# Patient Record
Sex: FEMALE | Race: WHITE | NOT HISPANIC OR LATINO | URBAN - METROPOLITAN AREA
[De-identification: names, ages, dates, MRNs, and addresses within clinical notes are randomized per-mention and may not be internally consistent; named-entity substitution may affect disease eponyms.]

---
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
## 2019-01-22 ENCOUNTER — EMERGENCY (EMERGENCY)
Facility: HOSPITAL | Age: 19
LOS: 1 days | Discharge: HOME | End: 2019-01-22
Attending: EMERGENCY MEDICINE | Admitting: EMERGENCY MEDICINE
Payer: MEDICAID

## 2019-01-22 VITALS
HEIGHT: 65 IN | DIASTOLIC BLOOD PRESSURE: 75 MMHG | TEMPERATURE: 98 F | RESPIRATION RATE: 18 BRPM | WEIGHT: 160.06 LBS | OXYGEN SATURATION: 97 % | SYSTOLIC BLOOD PRESSURE: 133 MMHG | HEART RATE: 92 BPM

## 2019-01-22 VITALS
SYSTOLIC BLOOD PRESSURE: 128 MMHG | HEART RATE: 88 BPM | OXYGEN SATURATION: 98 % | RESPIRATION RATE: 16 BRPM | DIASTOLIC BLOOD PRESSURE: 70 MMHG

## 2019-01-22 PROCEDURE — 99281 EMR DPT VST MAYX REQ PHY/QHP: CPT

## 2019-01-22 NOTE — ED ADULT TRIAGE NOTE - CHIEF COMPLAINT QUOTE
" Laceration to left 2nd finger from a knife 3PM yesterday, was seen at [facility], had Dermabond applied, started bleeding again "

## 2019-01-22 NOTE — ED PROVIDER NOTE - PROGRESS NOTE DETAILS
An opportunity to ask questions was given.  Discussed the importance of prompt, close medical follow-up.  Patient will return with any changes, concerns or persistent / worsening symptoms.  Understanding of all instructions verbalized.  wound care and signs of infection discussed. lac was reinforced with steri-strips and bulky dressing applied

## 2019-01-22 NOTE — ED PROVIDER NOTE - SKIN, MLM
healing 2 cm irregular flap lac to left index finger (distal phalanx, volar aspect) with dermabond in place. no current oozing of blood. no surrounding erythema, warmth or tenderness

## 2019-01-22 NOTE — ED PROVIDER NOTE - NSFOLLOWUPINSTRUCTIONS_ED_ALL_ED_FT
keep wound clean and dry. steri strips will fall off on their own. follow up with primary care provider or return to ED for any pain, redness, or signs of infection

## 2019-01-22 NOTE — ED PROVIDER NOTE - ATTENDING CONTRIBUTION TO CARE
I, Dr Simons, have personally performed a face to face diagnostic evaluation on this patient with the PA/NP. I have reviewed the PA/NP's note and agree with the history, Physical exam and plan of care As per history presents to ED for wound check of left index. cut her finger with a knife yesterday late afternoon. went to [facility] ED and lac was repaired with dermabond. patient noticed some oozing of blood after she was discharged but told "it was fine". noticed some more oozing of blood this am so wanted her finger checked. no pain or surrounding redness. no n/t. no bony tenderness. right handed. tetanus utd PE wound dehisce no s/s of infection wound redress to fallow up with PMD.

## 2019-01-22 NOTE — ED ADULT NURSE NOTE - CHIEF COMPLAINT QUOTE
" Laceration to left 2nd finger from a knife 3PM yesterday, was seen at [facility], had Dermabond applied, started bleeding again "

## 2019-01-22 NOTE — ED PROVIDER NOTE - OBJECTIVE STATEMENT
presents to ED for wound check of left index. cut her finger with a knife yesterday late afternoon. went to [facility] ED and lac was repaired with dermabond. patient noticed some oozing of blood after she was discharged but told "it was fine". noticed some more oozing of blood this am so wanted her finger checked. no pain or surrounding redness. no n/t. no bony tenderness. right handed. tetanus utd

## 2019-03-11 ENCOUNTER — TRANSCRIPTION ENCOUNTER (OUTPATIENT)
Age: 19
End: 2019-03-11

## 2020-10-20 NOTE — ED ADULT TRIAGE NOTE - HEIGHT IN CM
recent TTE in 08/2020 showing normal LVEF, stage 1 diastolic dysfunction  - TTE (10/6/20) LVEF of 55%   - Patient w/ continued LE edema, mild improvement  - Continue home dose Bumex (1mg PO BID)  - caution with excessive IVF 165.1

## 2022-01-13 NOTE — ED STATDOCS - NS ED MD EM SELECTION
[facility]  Certificate of Child Health Examination  Student's Name:   Fredy Restrepo Birth Date  2016  Sex  male  Race/Ethnicity   School/Grade Level/ID#     Address:   [address] [place] [address] [place] [address]  Parent/Guardian             Telephone#                                            Home:                               Work:   IMMUNIZATIONS: To be completed by health care provider. The mo/da/yr for every dose administered is required. If a specific vaccine is medically contraindicated, a separate written statement must be attached by the health care responsible for completing the health examination explaining the medical reason for the contraindication.      Immunization History   Administered Date(s) Administered   • COVID-19 5-11Y Pfizer-BioNtech 01/06/2022   • DTaP(INFANRIX) 03/02/2017   • DTaP/HIB/IPV 03/16/2016   • DTaP/Hep B/IPV 2015, 04/29/2016   • DTaP/IPV 12/07/2019   • Hep A, ped/adol, 2 dose 01/03/2017, 07/05/2017   • Hep B, adolescent or pediatric 2015   • Hib (PRP-T) 2015, 04/29/2016, 03/02/2017   • Influenza, injectable, quadrivalent, preservative-free 12/07/2019, 01/13/2022   • MMR 01/03/2017   • Measles Mumps Rubella Varicella 12/07/2019   • Pneumococcal Conjugate 13 valent 2015, 03/16/2016, 04/29/2016, 01/03/2017   • Rotavirus - monovalent 2015, 03/16/2016   • Varicella 01/03/2017   Up to date    Immunizations given 1/13/2022: None      Health care provider (MD, DO, APN, PA, school health professional, health official) verifying above immunization history must sign below. If adding dates to the above immunization history section, put your initials by date(s) and sign here.)  Signature                                                                 Title                                                Date  _____________________________________________________________________________________  Signature                                                                  Title                                                Date  _____________________________________________________________________________________   ALTERNATIVE PROOF OF IMMUNITY   1. Clinical diagnosis (measles, mumps, hepatitis B) is allowed when verified by physician and supported with lab confirmation.  Attach copy of lab result.    * MEASLES (Rubeola)  MO   DA  YR          **MUMPS  MO   DA  YR             HEPATITIS B  MO   DA   YR           VARICELLA  MO   DA  YR      2. History of varicella (chickenpox) disease is acceptable if verified by health care provider, school health professional or health official.  Person signing below verifies that the parent/guardian’s description of varicella disease history is indicative of past infection and is accepting such history as documentation of disease.  Date of Disease                                  Signature                                                           Title   3. Laboratory Evidence of Immunity (check one)      __ Measles*         __ Mumps**        __ Rubella        __Varicella    (Attach copy of lab result)         *All measles cases diagnosed on or after July 1, 2002, must be confirmed by laboratory evidence.      **All mumps cases diagnosed on or after July 1, 2013, must be confirmed by laboratory evidence.     Completion of Alternative 1 or 3 MUST be accompanied by Labs & Physician Signature: ___________________________  Physician Statements of Immunity MUST be submitted to [facility] for review.     Certificates of Hoahaoism Exemption to Immunizations or Physician Medical Statements of Medical Contraindication Are Reviewed   and Maintained by the School Authority     (11/2015)                                         (COMPLETE BOTH SIDES)                                  Approved [facility] 3/2016      Student's Name:  Fredy Restrepo Birth Date 2016 Sex male School Grade Level/ID#     Page 2 of 2   HEALTH HISTORY      TO BE COMPLETED AND SIGNED  BY PARENT/GUARDIAN AND VERIFIED BY HEALTH CARE PROVIDER  ALLERGIES: (Food, drug, insect, other) No has No Known Allergies.   MEDICATION: (List all prescribed or taken on a regular basis.)   None   Diagnosis of asthma?  Child wakes during night coughing? Yes    No  Yes    No  Loss of function of one of paired  organs?(eye/ear/kidney/testicle) Yes    No    Birth defects? Yes    No  Hospitalizations? Yes    No    Developmental delay? Yes    No   When? What for? Yes    No    Blood disorders? Hemophilia,  Sickle Cell, Other? Explain. Yes    No  Surgery? (List all.)  When? What for? Yes    No    Diabetes? Yes    No  Serious injury or illness? Yes    No    Head injury/Concussion/Passed out? Yes    No  TB skin test positive (past/present)? * Yes    No *If yes, refer to local [facility] dept   Seizures? What are they like?  Yes    No  TB disease (past or present)? * Yes    No *If yes, refer to local [facility] dept   Heart problem/Shortness of breath?  Yes    No  Tobacco use (type, frequency)?  Yes    No    Heart murmur/High blood pressure? Yes    No  Alcohol/Drug use?  Yes    No    Dizziness or chest pain with exercise? Yes    No  Family history of sudden death  before age 50? (Cause?) Yes    No    Eye/Vision problems? ______           __Glasses          __Contacts  Last exam by eye doctor ______  Other concerns? (crossed eye, drooping lids, squinting, difficulty reading) Dental?   __ Braces     __ Bridge     __ Plate   __Other   Ear/Hearing problems? Yes    No  Information may be shared with appropriate personnel for health and educational purposes.   Bone/Joint problem/injury/scoliosis? Yes    No  Parent/Guardian  Signature                                               Date   PHYSICAL EXAMINATION REQUIREMENTS   Entire section below to be completed by MD/DO/APN/PA Head Circumference if <2-3 years old - /65   Pulse 85   Temp 97.7 °F (36.5 °C)   Ht 4' (1.219 m)   Wt 26.9 kg (59 lb 6.4 oz)   BMI 18.13 kg/m²   BSA 0.95 m²     93 %ile (Z= 1.50) based on CDC (Boys, 2-20 Years) BMI-for-age based on BMI available as of 1/13/2022.   DIABETES SCREENING (NOT REQUIRED FOR ) BMI>85% age/sex Yes     And any two of the following: Family History: Yes  Ethnic Minority: Yes    Signs of Insulin Resistance (hypertension, dyslipidemia, polycystic ovarian syndrome, acanthosis nigricans): No  At Risk: No   LEAD RISK QUESTIONNAIRE Required for children age 6 months through 6 years enrolled in licensed or public school operated day care, , nursery school and/or . (Blood test required if resides in [place] or high risk [facility].)  Questionnaire Administered? No  Blood Test Indicated? Yes   Blood Test Date 1/13/22_____   Blood Test Result 5.2______________   TB SKIN OR BLOOD TEST Recommended only for children in high-risk groups including children immunosuppressed due to HIV infection or other conditions, frequent travel to or born in high prevalence countries or those exposed to adults in high-risk categories. See CDC guidelines. http://www.cdc.gov/tb/publications/factsheets/testing/TB_testing.htm.  Test Needed: No     Test performed: No                          Skin Test:    Date Read              /      /             Result:   Positive__      Negative__        mm________                          Blood Test: Date Reported       /      /               Result:  Positive__      Negative__      Value________  LAB TESTS (recommended) Date/Result  Date/Results   Hemoglobin or Hematocrit 1/13/22/ 11.6 Sickle Cell (when indicated) NA   Urinalysis NA Developmental Screening Tool NA        SYSTEM REVIEW Normal  Comments/Follow-up/Needs  Normal Comments/Follow-up/Needs   Skin  Yes  Endocrine Yes    Ears Yes                  Gastrointestinal Yes    Eyes Yes     Genito-Urinary deferred                                            LMP n/a   Nose Yes  Neurologic Yes    Throat Yes  Musculoskeletal Yes    Mouth/Dental No Dental caries Spinal Exam  Yes    Cardiovascular/HTN Yes  Nutritional status Yes    Respiratory Yes Diagnosis of Asthma: No   Mental Health Yes    Currently Prescribed Asthma Medication:        No  Quick-relief medication (e.g. Short Acting Beta Antagonist)        No   Controller medication (e.g. inhaled corticosteroid) Other  Follow up with dentist  Re-check lead level 1-3 months    NEEDS/MODIFICATIONS required in the school setting: No restrictions DIETARY Needs/Restrictions: No restrictions   SPECIAL INSTRUCTIONS/DEVICES e.g. safety glasses, glass eye, chest protector for arrhythmia, pacemaker, prosthetic device, dental bridge, false teeth, athletic support/cup: No restrictions   MENTAL HEALTH/OTHER Is there anything else the school should know about this student?  If you would like to discuss this student’s health with school or school health personnel:  Not needed   EMERGENCY ACTION needed while at school due to child’s health condition (e.g. ,seizures, asthma, insect sting, food, peanut allergy, bleeding problem, diabetes, heart problem)?   No   On the basis of the examination on this day, I approve this child’s participation in                                (If No or Modified please attach explanation.)  PHYSICAL EDUCATION:  Yes                               INTER[employer]OL[employer]IC [employer]ORTS   Yes   Print Name  [facility] PROVIDER         Signature                                                                       Date: 1/13/2022   Address:  [facility] [employer] [facility] [address] [id number] [phone]         (Complete Both Sides)     Approved [facility] 3/2016   [id number] Detailed

## 2022-05-26 NOTE — ED PROVIDER NOTE - NSPTACCESSSVCSAPPT_ED_ALL_ED
5/26/2022    Patient: Maxi Garcia   YOB: 1970   Date of Visit: 5/26/2022     Lorena Siu NP  Via     Dear Joceline Gardner,    I saw Poornima Zabala in the office today for a longstanding history of constipation. She moves her bowels once to twice per week but it can be up to 2 weeks before she moves her bowels. On exam she does have a moderate sized anterior rectocele. I have advised a high-fiber diet and either Citrucel or MiraLAX daily. We will schedule her for a colonoscopy as she has not had one for screening purposes to this point. If conservative measures fail we will institute a more thorough work-up for her constipation, including Sitzmarks study and defecography. If you have questions, please do not hesitate to call me. I look forward to following your patient along with you.       Sincerely,    Landon Clinton MD
PCP for Routine Care
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
## 2023-04-09 NOTE — ED PROVIDER NOTE - CROS ED RESP ALL NEG
Pt switched from stretcher to hospital bed for comfort. Wheels locked. Bed low. Call light in reach.   
negative...

## 2023-08-14 NOTE — ED BEHAVIORAL HEALTH ASSESSMENT NOTE - PRIVATE RESIDENCE
Patient on 2S on room air. PIV is infusing. Patient frequently ambulates around the room. Patient is A/Ox4. Norco being given for pain q4. Call light explained to patient and is within reach. Wrapping is clean/dry/intact on right hand.    mother

## 2024-04-13 NOTE — ED PROVIDER NOTE - MUSCULOSKELETAL, MLM
(E4) spontaneous
Spine appears normal, range of motion is not limited, no muscle or joint tenderness
